# Patient Record
Sex: FEMALE | Race: ASIAN | NOT HISPANIC OR LATINO | URBAN - METROPOLITAN AREA
[De-identification: names, ages, dates, MRNs, and addresses within clinical notes are randomized per-mention and may not be internally consistent; named-entity substitution may affect disease eponyms.]

---

## 2019-12-12 ENCOUNTER — GYNECOLOGY VISIT (OUTPATIENT)
Dept: OBGYN | Facility: CLINIC | Age: 34
End: 2019-12-12
Payer: COMMERCIAL

## 2019-12-12 VITALS — WEIGHT: 121 LBS | DIASTOLIC BLOOD PRESSURE: 65 MMHG | SYSTOLIC BLOOD PRESSURE: 120 MMHG

## 2019-12-12 DIAGNOSIS — N93.9 ABNORMAL UTERINE BLEEDING (AUB): ICD-10-CM

## 2019-12-12 DIAGNOSIS — Z12.4 SCREENING FOR CERVICAL CANCER: ICD-10-CM

## 2019-12-12 DIAGNOSIS — Z01.419 WELL WOMAN EXAM: ICD-10-CM

## 2019-12-12 PROCEDURE — 99385 PREV VISIT NEW AGE 18-39: CPT | Performed by: OBSTETRICS & GYNECOLOGY

## 2019-12-12 RX ORDER — NORELGESTROMIN AND ETHINYL ESTRADIOL 35; 150 UG/MG; UG/MG
1 PATCH TRANSDERMAL
Qty: 4 PATCH | Refills: 13 | Status: SHIPPED | OUTPATIENT
Start: 2019-12-12

## 2019-12-12 SDOH — HEALTH STABILITY: MENTAL HEALTH: HOW OFTEN DO YOU HAVE A DRINK CONTAINING ALCOHOL?: NEVER

## 2019-12-12 NOTE — PROGRESS NOTES
New patient   CC: AUB, needs pap     Nona Guzman is a 34 y.o.  who presents today as a new patient.  Reports her menses have been longer since having her child about 2 years ago.  Menses used to last about 6 days and would occur every month.  Were never particularly heavy.  Now menses last for about 10 days.  These cycles are still not particularly heavy but she finds it annoying that they last so long.  Has never been on contraception or seen a doctor for her menses.  Is not interested in conceiving and would like to discuss options.  Denies any other concerns today.    GYN History:  LMP 18.  Menarche @12.  Has never had a pap.  No history of cone biopsy, LEEP or any other cervical, uterine or gynecologic surgery. No history of sexually transmitted diseases.  Currently sexually active with one male partner.  Utilizing condoms for contraception and has used condoms in the past.     OB History:    OB History   No obstetric history on file.   G1 -  10/7/2017 performed in [place] for unsure reason (didn't go into labor)    Review of Systems:   Pertinent positives documented in HPI and all other systems reviewed & are negative.    All PMH, PSH, allergies, social history and FH reviewed and updated today:  Past Medical History:  History reviewed. No pertinent past medical history.    Past Surgical History:  Past Surgical History:   Procedure Laterality Date   • PRIMARY C SECTION       Medications:   No current Epic-ordered outpatient medications on file.     No current Epic-ordered facility-administered medications on file.      Allergies: Patient has no known allergies.    Social History:  Social History     Socioeconomic History   • Marital status:      Spouse name: Not on file   • Number of children: Not on file   • Years of education: Not on file   • Highest education level: Not on file   Occupational History   • Not on file   Social Needs   • Financial resource strain: Not on file   • Food  insecurity:     Worry: Not on file     Inability: Not on file   • Transportation needs:     Medical: Not on file     Non-medical: Not on file   Tobacco Use   • Smoking status: Never Smoker   • Smokeless tobacco: Never Used   Substance and Sexual Activity   • Alcohol use: Never     Frequency: Never   • Drug use: Not on file   • Sexual activity: Yes     Partners: Male   Lifestyle   • Physical activity:     Days per week: Not on file     Minutes per session: Not on file   • Stress: Not on file   Relationships   • Social connections:     Talks on phone: Not on file     Gets together: Not on file     Attends Hindu service: Not on file     Active member of club or organization: Not on file     Attends meetings of clubs or organizations: Not on file     Relationship status: Not on file   • Intimate partner violence:     Fear of current or ex partner: Not on file     Emotionally abused: Not on file     Physically abused: Not on file     Forced sexual activity: Not on file   Other Topics Concern   • Not on file   Social History Narrative   • Not on file       Family History:  Family History   Problem Relation Age of Onset   • No Known Problems Mother    • No Known Problems Father            Objective:   Vitals:  /65   Wt 54.9 kg (121 lb)   There is no height or weight on file to calculate BMI. (Goal BM I>18 <25)    Physical Exam:   Nursing note and vitals reviewed.  GENERAL: No acute distress  HENT: Atraumatic, normocephalic  EYES: Extraocular movements intact, pupils equal and reactive to light  NECK: Supple, Full ROM  CHEST: No deformities, Equal chest expansion  RESP: Unlabored, no stridor or audible wheeze  ABD: Soft, Nontender, Non-Distended  Extremities: No Clubbing, Cyanosis, or Edema  Skin: Warm/dry, without rases  Neuro: A/O x 4, CN 2-12 Grossly intact, Motor/sensory grossly intact  Psych: Normal mood, behavior, and affect    Genitourinary:  Normal appearing external female genitalia without any rashes,  lesions, labial fusion or tenderness.  Vagina is pink moist and well rugated. Some blood pesent within vaginal vault.  Cervix well visualized without masses or lesions.  On bimanual exam there is no cervical motion tenderness, uterus is anteverted, not enlarged, fixed, or tender.  No adnexal masses or tenderness.      Assessment/Plan:   Nona Guzman is a 34 y.o. No obstetric history on file. female who presents for:    1. Abnormal uterine bleeding (AUB)  norelgestromin-ethinyl estradiol (ORTHO EVRA) 150-35 MCG/24HR patch   2. Screening for cervical cancer  THINPREP PAP W/HPV AND CTNG   #Well woman.  Discussed breast self awareness, keeping up to date on vaccination, safety precautions, diet and exercise.  # AUB - longer menses.  Today we discussed birth control methods. The methods we discussed pills, patch, vaginal ring, sub-dermal implant, depo injection, intrauterine devices  #Screen for cervical cancer.  Has never had a pap.  Performed today along with HPV.  Will manage as per asccp guidelines upon result.  #Follow up.  RTC in 3 months or sooner if concerns or questions arise.    Patient was seen for 45 minutes of which > 50% of appointment time was spent on face-to-face counseling and coordination of care regarding the above.

## 2021-11-23 ENCOUNTER — TELEPHONE (OUTPATIENT)
Dept: OBGYN | Facility: CLINIC | Age: 36
End: 2021-11-23

## 2021-12-09 ENCOUNTER — TELEPHONE (OUTPATIENT)
Dept: OBGYN | Facility: CLINIC | Age: 36
End: 2021-12-09

## 2021-12-10 ENCOUNTER — APPOINTMENT (OUTPATIENT)
Dept: OBGYN | Facility: CLINIC | Age: 36
End: 2021-12-10
Payer: COMMERCIAL

## 2021-12-10 NOTE — NON-PROVIDER
Patient here c/o  LMP=  BCM:  Last pap date/result:  Last mammogram if applicable  Phone number:  Pharmacy confirmed.

## 2023-08-09 ENCOUNTER — OFFICE VISIT (OUTPATIENT)
Dept: INTERNAL MEDICINE | Facility: OTHER | Age: 38
End: 2023-08-09
Payer: COMMERCIAL

## 2023-08-09 VITALS
WEIGHT: 117.2 LBS | DIASTOLIC BLOOD PRESSURE: 70 MMHG | HEART RATE: 74 BPM | OXYGEN SATURATION: 97 % | HEIGHT: 61 IN | TEMPERATURE: 98.8 F | BODY MASS INDEX: 22.13 KG/M2 | SYSTOLIC BLOOD PRESSURE: 107 MMHG

## 2023-08-09 DIAGNOSIS — Z86.39 HISTORY OF HYPERLIPIDEMIA: ICD-10-CM

## 2023-08-09 DIAGNOSIS — Z23 NEED FOR VACCINATION: ICD-10-CM

## 2023-08-09 DIAGNOSIS — N93.9 ABNORMAL UTERINE BLEEDING: ICD-10-CM

## 2023-08-09 PROCEDURE — 90471 IMMUNIZATION ADMIN: CPT

## 2023-08-09 PROCEDURE — 3078F DIAST BP <80 MM HG: CPT | Mod: GC

## 2023-08-09 PROCEDURE — 99203 OFFICE O/P NEW LOW 30 MIN: CPT | Mod: 25,GE

## 2023-08-09 PROCEDURE — 3074F SYST BP LT 130 MM HG: CPT | Mod: GC

## 2023-08-09 PROCEDURE — 90715 TDAP VACCINE 7 YRS/> IM: CPT

## 2023-08-09 ASSESSMENT — PATIENT HEALTH QUESTIONNAIRE - PHQ9: CLINICAL INTERPRETATION OF PHQ2 SCORE: 0

## 2023-08-09 NOTE — PROGRESS NOTES
Office Visit Initial Encounter    Chief Complaint   Patient presents with    Establish Care       HPI   Ms. Guzman is a 39 yo cantonese-speaking female (translation services #[id number]) with history of AUB on hormonal therapy (f/u by Gyn), otherwise healthy, who comes in for establishment of care. Patient does not have acute complains. She is not currently working, takes care of things at home, lives with her 6-yo son and . States she was told once she had high cholesterol. She goes biking with her son once a week and has a balanced diet.     Past Medical History  No past medical history on file.    Allergies:     Patient has no known allergies.    Medications    Current Outpatient Medications:     norelgestromin-ethinyl estradiol, 1 Patch, Transdermal, Q7 DAYS, Taking    Family History  Family History   Problem Relation Age of Onset    No Known Problems Mother     No Known Problems Father        Surgical History  Past Surgical History:   Procedure Laterality Date    PRIMARY C SECTION         Social History   Social History     Tobacco Use    Smoking status: Never    Smokeless tobacco: Never   Vaping Use    Vaping Use: Never used   Substance Use Topics    Alcohol use: Never    Drug use: Never       ROS     General: No fevers, chills, night sweats, weight loss or gain.  H&N: No hearing changes, vision changes, eye pain, ear pain, nasal discharge, sore throat, no neck swelling.  Pulmonary: No shortness of breath, cough, sputum, or hemoptysis.  Cardiovascular: No chest pain, palpitations, or LE swelling.   GI: No nausea, vomiting, diarrhea, constipation, abdominal pain, hematochezia or melena. Soft BM twice a day for the last week.   : No dysuria, frequency, retention or hematuria.   Neuro: No headaches, no lightheadedness, no dizziness. No focal weakness, no general weakness. No gait disturbances.   Psych: No anxiety or depression.     Physical Exam     /70 (BP Location: Left arm, Patient Position:  "Sitting, BP Cuff Size: Adult)   Pulse 74   Temp 37.1 °C (98.8 °F) (Temporal)   Ht 1.556 m (5' 1.25\")   Wt 53.2 kg (117 lb 3.2 oz)   SpO2 97%   BMI 21.96 kg/m²       General: Awake, alert and oriented.  Head and Neck: NC/AT, EOMI, no scleral icterus or conjunctival pallor, no nasal discharge or oral erythema or exudates. Neck supple, no LADs.   CV: Rhythmic heart sounds, no murmurs, gallops or rubs. No S3,S4 or JVD. Dorsalis pedis/posterior tibial pulses present, symmetrical.   Pulm: Chest expansion is symmetrical, no crackles, rales, rhonchi, or wheezing.  GI: Flat, non distended, soft, non tender, normal bowel sounds.  Skin: Warm, no rashes, no lesions.  MSK: Normal ROM. No lower extremity edema. No lesions.   Neuro: Patient is alert and oriented x3. Speech is clear and fluent, goal directed. Is able to name, repeat and comprehend. Pupils equal, round and reactive to light. Good eye contact. Extra ocular movements intact. Facial and body symmetry. Strength 5 out of 5 in upper and lower extremities. Sensitivity intact. Normal deep tendon reflexes. Normal tone.   Psych: Appropriate mood and affect.     Diagnostic tests  None.    Note: I have reviewed all pertinent labs and diagnostic tests associated with this visit with specific comments listed under the assessment and plan below    Assessment and Plan    AUB  Improved with hormonal therapy.  - Continue current regimen and f/u with Gyn   - Check CBC and CMP    HCM   TDaP vaccine provided  Last pap smear reported as wnl last year  Discussed healthy lifestyle     History of hyperlipidemia   - Check lipid panel     Return in about 1 year (around 8/9/2024).    Kathia SILVA PGY-2  Internal Medicine [facility]    Pt has been seen and discussed with the Attending Physician   "

## 2023-08-09 NOTE — PATIENT INSTRUCTIONS
Thank you for coming today!   Please remember a balanced lifestyle helps to keep your heart healthy. Some tips for a healthy heart include:  Exercise for 30 minutes, at least 5 times a week or 1 hour 3 times a week.   Avoid fatty, processed, sweetened food.   Please get the labs done before next visit, I will see you in 1 year if anything is abnormal  Continue following with gynecologist

## 2023-08-19 DIAGNOSIS — E78.2 MODERATE MIXED HYPERLIPIDEMIA NOT REQUIRING STATIN THERAPY: ICD-10-CM

## 2023-11-15 ENCOUNTER — APPOINTMENT (OUTPATIENT)
Dept: INTERNAL MEDICINE | Facility: OTHER | Age: 38
End: 2023-11-15
Payer: COMMERCIAL

## 2024-02-28 ENCOUNTER — GYNECOLOGY VISIT (OUTPATIENT)
Dept: OBGYN | Facility: CLINIC | Age: 39
End: 2024-02-28
Payer: COMMERCIAL

## 2024-02-28 VITALS — BODY MASS INDEX: 22.11 KG/M2 | SYSTOLIC BLOOD PRESSURE: 115 MMHG | DIASTOLIC BLOOD PRESSURE: 63 MMHG | WEIGHT: 118 LBS

## 2024-02-28 DIAGNOSIS — Z01.419 WOMEN'S ANNUAL ROUTINE GYNECOLOGICAL EXAMINATION: Primary | ICD-10-CM

## 2024-02-28 PROCEDURE — 99385 PREV VISIT NEW AGE 18-39: CPT | Performed by: OBSTETRICS & GYNECOLOGY

## 2024-02-28 PROCEDURE — 3074F SYST BP LT 130 MM HG: CPT | Performed by: OBSTETRICS & GYNECOLOGY

## 2024-02-28 PROCEDURE — 3078F DIAST BP <80 MM HG: CPT | Performed by: OBSTETRICS & GYNECOLOGY

## 2024-02-28 ASSESSMENT — FIBROSIS 4 INDEX: FIB4 SCORE: 0.49

## 2024-02-28 NOTE — PROGRESS NOTES
"ANNUAL GYNECOLOGY VISIT    Chief Complaint  Gynecologic Exam      Subjective  Shyla Guzman is a 38 y.o. female who presents today for Annual Exam as NIVIA from Dr. Barroso.  Last pap in 2022 and 2021 both wnl with neg HPV.  She complains of a vague \"pulling\" sensation in her vagina without any specific alleviating or exacerbating factors.  She also feels like her urinary stream is weak but denies any surgeries for incontinence. She's had 1  and 1 hysteroscopy/D&C for SAB/uterine adhesions.  She is sexually active and uses condoms    Preventive Care   Immunization History   Administered Date(s) Administered    MODERNA SARS-COV-2 VACCINE (12+) 04/10/2021, 2021, 2021    Tdap Vaccine 2023     Last Mammogram: n/a    Gynecology History and ROS  Current Sexual Activity: Yes  History of sexually transmitted diseases?  no  Abnormal discharge? No  Current Contraception:  condoms    Menstrual History  Patient's last menstrual period was 2024 (exact date).  Periods are regular  q month except she reports skipping menses in December but had normal menses in  and Feb  She reports rare hot flashes     Clots or heavy flow: No  Dysmenorrhea: No  Intermenstrual bleeding/spotting: No  Significant pain with periods:No  Bothersome PMS symptoms: No  Significant Pelvic Pain: No      Pap History  Last pap smear: 2022 - pap/cotest wnl  History of moderate or severe dysplasia: No    Cancer Risk Assessement:  Family history of:   - Breast cancer: no   - Ovarian cancer: no   - Uterine cancer: no   - Colon cancer: no    Obstetric History  OB History    Para Term  AB Living   2 1 1   1 1   SAB IAB Ectopic Molar Multiple Live Births   1         1      # Outcome Date GA Lbr [place]/2nd Weight Sex Delivery Anes PTL Lv   2 Term 2017 [id number]   M CS-LTranv  N DEBORAH   1 SAB                Past Medical History  History reviewed. No pertinent past medical history.    Past Surgical History  Past Surgical " History:   Procedure Laterality Date    PRIMARY C SECTION         Social History  Social History     Socioeconomic History    Marital status:      Spouse name: Not on file    Number of children: Not on file    Years of education: Not on file    Highest education level: Not on file   Occupational History    Not on file   Tobacco Use    Smoking status: Never    Smokeless tobacco: Never   Vaping Use    Vaping Use: Never used   Substance and Sexual Activity    Alcohol use: Never    Drug use: Never    Sexual activity: Yes     Partners: Male     Birth control/protection: Pill   Other Topics Concern    Not on file   Social History Narrative    Not on file     Social Determinants of Health     Financial Resource Strain: Not on file   Food Insecurity: Not on file   Transportation Needs: Not on file   Physical Activity: Not on file   Stress: Not on file   Social Connections: Not on file   Intimate Partner Violence: Not on file   Housing Stability: Not on file       Family History  Family History   Problem Relation Age of Onset    No Known Problems Mother     No Known Problems Father        Home Medications  Current Outpatient Medications on File Prior to Visit   Medication Sig Dispense Refill    norelgestromin-ethinyl estradiol (ORTHO EVRA) 150-35 MCG/24HR patch Apply 1 Patch to skin as directed every 7 days. 4 Patch 13     No current facility-administered medications on file prior to visit.       Allergies/Reactions  No Known Allergies    ROS  Positive ROS: none  Gen: no fevers or chills, no significant weight loss or gain, excessive fatigue  Respiratory:  no cough or dyspnea  Cardiac:  no chest pain, no palpitations, no syncope  Breast: no breast discharge, pain, lump or skin changes  GI:  no heartburn, no abdominal pain, no nausea or vomiting  Urinary: no dysuria, urgency, frequency, incontinence   Psych: no depression or anxiety  Neuro: no migraines with aura, fainting spells, numbness or tingling  Extremities: no  "joint pain, persistently swollen ankles, recurrent leg cramps      Physical Examination:  Vital Signs:   Vitals:    02/28/24 1332   BP: 115/63   BP Location: Right arm   Patient Position: Sitting   BP Cuff Size: Adult   Weight: 118 lb     Body mass index is 22.11 kg/m².    Constitutional: The patient is well developed and well nourished.  Psychiatric: Patient is oriented to time place and person.   Skin: No rash observed.  Neck: Appears symmetric. Thyroid normal size  Respiratory: normal effort  Breast: Inspection reveals no asymetry or nipple discharge, no skin thickening, dimpling or erythema.  Palpation demonstrates no masses.  Abdomen: Soft, non-tender.  Pelvic Exam:     Vulva: external female genitalia are normal in appearance. No lesions    Urethra - no lesions, no erythema    Vagina: moist, pink, normal ruggae    Cervix: pink, smooth, no lesions, no CMT    Uterus - non-tender, normal size, shape, contour, mobile, anteverted    Ovaries: non-tender, no appreciable masses  Pap Smear performed: No  Extremeties: Legs are symmetric and without tenderness. There is no edema present.    Labs/Imaging:  HDL (mg/dL)   Date Value   02/16/2024 70     No components found for: \"A1C1\"  WBC (x10E3/uL)   Date Value   02/16/2024 6.8     Lab Results   Component Value Date/Time    CO2 23 02/16/2024 0757    BUN 11 02/16/2024 0757           Assessment & Plan  Shyla Guzman is a 38 y.o. female who presents today for Annual Gyn Exam.     1. Women's annual routine gynecological examination  Breast and pelvic exam completed  Pap: not indicated until 2025  Mammogram: order at annual next year when 39  Advised on breast self awareness  Advised on flu and/or covid vaccines in season  Advised on calcium and vit D intake     - Discussed options for pelvic floor PT for pulling sensation although nothing found on exam.  Discussed option for referral to urogyn for urinary flow if desired.   She elects to observe both of these issues for " now          Return: Annually or PRN    Briana Haas D.O.

## 2024-03-08 ENCOUNTER — OFFICE VISIT (OUTPATIENT)
Dept: INTERNAL MEDICINE | Facility: OTHER | Age: 39
End: 2024-03-08
Payer: COMMERCIAL

## 2024-03-08 VITALS
BODY MASS INDEX: 22.69 KG/M2 | OXYGEN SATURATION: 96 % | HEART RATE: 74 BPM | HEIGHT: 61 IN | DIASTOLIC BLOOD PRESSURE: 60 MMHG | WEIGHT: 120.2 LBS | SYSTOLIC BLOOD PRESSURE: 106 MMHG | TEMPERATURE: 98.2 F

## 2024-03-08 DIAGNOSIS — Z11.59 ENCOUNTER FOR HCV SCREENING TEST FOR LOW RISK PATIENT: ICD-10-CM

## 2024-03-08 DIAGNOSIS — E78.2 MIXED HYPERLIPIDEMIA: ICD-10-CM

## 2024-03-08 PROCEDURE — 3074F SYST BP LT 130 MM HG: CPT

## 2024-03-08 PROCEDURE — 99213 OFFICE O/P EST LOW 20 MIN: CPT | Mod: GE

## 2024-03-08 PROCEDURE — 3078F DIAST BP <80 MM HG: CPT

## 2024-03-08 ASSESSMENT — FIBROSIS 4 INDEX: FIB4 SCORE: 0.49

## 2024-03-08 ASSESSMENT — PATIENT HEALTH QUESTIONNAIRE - PHQ9: CLINICAL INTERPRETATION OF PHQ2 SCORE: 0

## 2024-03-08 NOTE — PROGRESS NOTES
"      Office Visit Follow up    Chief Complaint   Patient presents with    Lab Results    Hyperlipidemia     Patient here for lab follow up       Subjective   Ms. Guzman is a 39 yo cantonese-speaking female (translation services #[id number]) with history of AUB on hormonal therapy (f/u by Gyn), and hypertriglyceridemia (on omega 3), who presents for follow up on this latter chronic problem. She has a fish and vegetable-based diet, denies significant consumption of sweetened or processed food, no fried/fast food. No weight gain. No family hx of cardiovascular disease but mom and dad suffer from high cholesterol ages 50-60s.     ROS     General: No fevers, chills, night sweats, weight loss or gain.  H&N: No hearing changes, vision changes, eye pain, ear pain, nasal discharge, sore throat, no neck swelling.  Pulmonary: No shortness of breath, cough, sputum, or hemoptysis.  Cardiovascular: No chest pain, palpitations, or LE swelling.   GI: No nausea, vomiting, diarrhea, constipation, abdominal pain, hematochezia or melena.  : No dysuria, frequency, incontinence, retention or hematuria.   MSK: Some occasional mild lower back pain, does not radiate anywhere, no worsening. No sensory changes.   Neuro: No headaches, no lightheadedness, no dizziness. No focal weakness, no general weakness. No gait disturbances.   Psych: No anxiety or depression.     Physical Exam     /60 (BP Location: Left arm, Patient Position: Sitting, BP Cuff Size: Adult)   Pulse 74   Temp 36.8 °C (98.2 °F) (Temporal)   Ht 1.556 m (5' 1.25\")   Wt 54.5 kg (120 lb 3.2 oz)   LMP 02/18/2024 (Exact Date)   SpO2 96%   BMI 22.53 kg/m²     General: No acute distress, good interaction.   Head and Neck: NC/AT, EOMI, no scleral icterus or conjunctival pallor, no nasal discharge or oral erythema or exudates. Neck supple, no LADs.   CV: Rhythmic heart sounds, no murmurs, gallops or rubs. No S3,S4 or JVD. Dorsalis pedis/posterior tibial pulses present, " symmetrical.   Pulm: Chest expansion is symmetrical, no crackles, rales, rhonchi, or wheezing.  GI: Flat, non distended, soft, non tender, normal bowel sounds.  MSK: Normal ROM. No lower extremity edema. No lesions. No point tenderness in lower back.   Neuro: Patient is alert and oriented x3. Speech is clear and fluent, goal directed. Pupils equal, round and reactive to light. Good eye contact. Extra ocular movements intact. Facial and body symmetry. Strength 5 out of 5 in upper and lower extremities. Sensitivity intact. Normal deep tendon reflexes. Normal tone.   Psych: Appropriate mood and affect.     Diagnostic tests  2/16/24: BMP normal, LP: 260/90/70/175. CBC normal.     Note: I have reviewed all pertinent labs and diagnostic tests associated with this visit with specific comments listed under the assessment and plan below    Assessment and Plan    Mixed hyperlipidemia   (Hypertriglyceridemia and hyperlipidemia)   LDL significantly increased after she started OTC fish oil omega - 3. She is taking 2g a day. Likely this raise in LDL is not associated with metabolic syndrome.  - Counseled on stopping therapy or reducing to 500mg a day   - Recheck lipid panel before next visit   - Counseled on regular exercise routine and continue balanced diet   - Check TSH with next labs     Return in about 4 months (around 7/8/2024).      Kathia SILVA PGY-2  Internal Medicine [facility]    Pt has been seen and discussed with the Attending Physician

## 2024-03-08 NOTE — PATIENT INSTRUCTIONS
Get the blood work a week before next visit  Stop the 1000mg fish oil, just take 500mg once a day

## 2024-07-03 ENCOUNTER — APPOINTMENT (OUTPATIENT)
Dept: INTERNAL MEDICINE | Facility: OTHER | Age: 39
End: 2024-07-03
Payer: COMMERCIAL

## 2024-07-10 ENCOUNTER — APPOINTMENT (OUTPATIENT)
Dept: INTERNAL MEDICINE | Facility: OTHER | Age: 39
End: 2024-07-10
Payer: COMMERCIAL

## 2024-07-10 VITALS
SYSTOLIC BLOOD PRESSURE: 96 MMHG | DIASTOLIC BLOOD PRESSURE: 56 MMHG | OXYGEN SATURATION: 96 % | WEIGHT: 116 LBS | HEIGHT: 61 IN | TEMPERATURE: 97.2 F | HEART RATE: 89 BPM | BODY MASS INDEX: 21.9 KG/M2

## 2024-07-10 DIAGNOSIS — E78.2 MIXED HYPERLIPIDEMIA: ICD-10-CM

## 2024-07-10 DIAGNOSIS — E05.90 HYPERTHYROIDISM: ICD-10-CM

## 2024-07-10 PROCEDURE — 99213 OFFICE O/P EST LOW 20 MIN: CPT | Mod: GE

## 2024-07-10 PROCEDURE — 3078F DIAST BP <80 MM HG: CPT

## 2024-07-10 PROCEDURE — 3074F SYST BP LT 130 MM HG: CPT

## 2024-07-10 ASSESSMENT — FIBROSIS 4 INDEX: FIB4 SCORE: 0.49

## 2024-08-21 ENCOUNTER — APPOINTMENT (OUTPATIENT)
Dept: INTERNAL MEDICINE | Facility: OTHER | Age: 39
End: 2024-08-21
Payer: COMMERCIAL

## 2024-08-27 DIAGNOSIS — E05.90 HYPERTHYROIDISM: ICD-10-CM

## 2024-08-28 ENCOUNTER — TELEPHONE (OUTPATIENT)
Dept: INTERNAL MEDICINE | Facility: OTHER | Age: 39
End: 2024-08-28
Payer: COMMERCIAL

## 2024-08-28 NOTE — TELEPHONE ENCOUNTER
----- Message from Resident Kathia Hassan M.D. sent at 8/27/2024  1:32 PM PDT -----  Ok thanks [place].   It's fine, she can keep the upcoming appointment, in the meantime I have placed an endocrinology referral, can you please let her know of it and provide guidance on scheduling the thyroid ultrasound? I don't think she has scheduled that.     Thanks!   OBNY NUÑEZ  ----- Message -----  From: [facility]  Sent: 8/26/2024   8:34 AM PDT  To: Kathia Hassan M.D.    Hi Dr. Hassan,   There is no phone translation for telephone calls. Do you need patient to come in sooner?  ----- Message -----  From: Kathia Hassan M.D.  Sent: 8/24/2024  10:27 AM PDT  To: [facility]    Saad Be,     I would like to call this patient about her results, I was wondering if there is any translation services I can use for just a telephone encounter? If not that is fine, I'll see her next month. Pls let me know.     Thanks!  BONY NUÑEZ

## 2024-09-23 ENCOUNTER — HOSPITAL ENCOUNTER (OUTPATIENT)
Dept: RADIOLOGY | Facility: MEDICAL CENTER | Age: 39
End: 2024-09-23
Payer: COMMERCIAL

## 2024-09-23 DIAGNOSIS — E05.90 HYPERTHYROIDISM: ICD-10-CM

## 2024-09-23 PROCEDURE — 76536 US EXAM OF HEAD AND NECK: CPT

## 2024-09-27 ENCOUNTER — APPOINTMENT (OUTPATIENT)
Dept: INTERNAL MEDICINE | Facility: OTHER | Age: 39
End: 2024-09-27
Payer: COMMERCIAL

## 2024-09-27 VITALS
SYSTOLIC BLOOD PRESSURE: 113 MMHG | TEMPERATURE: 98.2 F | HEART RATE: 82 BPM | WEIGHT: 110.4 LBS | HEIGHT: 61 IN | OXYGEN SATURATION: 95 % | DIASTOLIC BLOOD PRESSURE: 59 MMHG | BODY MASS INDEX: 20.84 KG/M2

## 2024-09-27 DIAGNOSIS — E78.2 MIXED HYPERLIPIDEMIA: ICD-10-CM

## 2024-09-27 DIAGNOSIS — E05.00 GRAVES' DISEASE: ICD-10-CM

## 2024-09-27 LAB
POCT INT CON NEG: NEGATIVE
POCT INT CON POS: POSITIVE
POCT URINE PREGNANCY TEST: NEGATIVE

## 2024-09-27 RX ORDER — PROPRANOLOL HCL 60 MG
60 CAPSULE, EXTENDED RELEASE 24HR ORAL DAILY
Qty: 100 CAPSULE | Refills: 0 | Status: SHIPPED | OUTPATIENT
Start: 2024-09-27

## 2024-09-27 ASSESSMENT — FIBROSIS 4 INDEX: FIB4 SCORE: .5405405405405405405

## 2024-09-27 NOTE — PROGRESS NOTES
"      Office Visit Follow up    Chief Complaint   Patient presents with    Follow-Up     bloodwork       Subjective   Ms. Guzman is a 40 yo cantonese-speaking female (translation services #[id number]) with history of AUB on hormonal therapy (f/u by Gyn), dyslipidemia and hypertriglyceridemia (on omega 3), who presents for follow up on recent diagnosis of hyperthyroidism.  We first checked a TSH due to her very abnormal lipid panel, occasional diarrhea and family history of hyperthyroidism.  Her TSH came back low, then repeated and persistently low with 2 times higher T4 in 2 3.  TSI are elevated, consistent with Graves' disease.  Today, she reports brief episodes of palpitations lasting less than a minute happening once or twice a day, she is being feeling more warm throughout the day, notices skin and hair changes, has 2-3 bowel movements a day of normal consistency.  Denies chest pain, shortness of breath, abdominal pain, leg swelling.  Her last period was 2 to 3 months ago, she stopped using her hormone therapy, states no concern for pregnancy.    ROS     General: No fevers, chills, night sweats, weight loss or gain but difficulty gaining weight.   Pulmonary: No shortness of breath, cough, sputum, or hemoptysis.  : No dysuria, frequency, incontinence, retention or hematuria.   Neuro: No headaches, no lightheadedness, no dizziness. No focal weakness, no general weakness. No gait disturbances.   Psych: No anxiety or depression or mood changes in general.    Physical Exam     /59 (BP Location: Left arm, Patient Position: Sitting, BP Cuff Size: Small adult)   Pulse 82   Temp 36.8 °C (98.2 °F) (Temporal)   Ht 1.55 m (5' 1.02\")   Wt 50.1 kg (110 lb 6.4 oz)   SpO2 95%   BMI 20.84 kg/m²     General: No acute distress, good interaction.   Head and Neck: NC/AT, EOMI, no scleral icterus or conjunctival pallor, no nasal discharge or oral erythema or exudates. Neck supple, no LADs, + symmetric thyromegaly.   CV: " Rhythmic heart sounds, no murmurs, gallops or rubs. No S3,S4 or JVD. Dorsalis pedis/posterior tibial pulses present, symmetrical.   Pulm: Chest expansion is symmetrical, no crackles, rales, rhonchi, or wheezing.  GI: Flat, non distended, soft, non tender, normal bowel sounds.  MSK: Normal ROM. No lower extremity edema. No lesions. No point tenderness in lower back.   Neuro: Patient is alert and oriented x3. Speech is clear and fluent, goal directed. Pupils equal, round and reactive to light. Good eye contact. Extra ocular movements intact. Facial and body symmetry. Strength 5 out of 5 in upper and lower extremities. Sensitivity intact. Normal deep tendon reflexes. Normal tone.   Psych: Appropriate mood and affect.     Diagnostic tests  Thyroid US 9/23: Heterogeneous thyroid parenchyma, otherwise unremarkable thyroid ultrasound.    Note: I have reviewed all pertinent labs and diagnostic tests associated with this visit with specific comments listed under the assessment and plan below    Assessment and Plan    Hyperthyroidism  Persistently low TSH, T4 and T3 2 times upper level normal.  Positive TSI.  No nodules or thyromegaly seen in ultrasound, just heterogeneous thyroid tissue.  Consistent with Graves' disease.  Very mild symptoms as mentioned above, no concern for significant cardiovascular disease/impact.  No Graves' ophthalmopathy. Given her mild disease, she has an overall good prognosis in terms of potential regression after 2 years of medical therapy if that is her decision, she is to have further discussions with endocrinology about definitive therapies.  -Start propranolol 60 mg daily (extended release)  -Gave her information to schedule with endocrinology (referral had been placed)  -Pregnancy test obtained in office, negative    Dyslipidemia   Hypertriglyceridemia  Likely associated with thyroid disease.  No new interventions for now.  - Keep current dose of Omega 3    Return in about 5 weeks (around  11/1/2024).    Kathia SILVA PGY-3  Internal Medicine [facility]    Pt has been discussed with the Attending Physician

## 2024-10-30 ENCOUNTER — OFFICE VISIT (OUTPATIENT)
Dept: ENDOCRINOLOGY | Facility: MEDICAL CENTER | Age: 39
End: 2024-10-30
Payer: COMMERCIAL

## 2024-10-30 VITALS
HEIGHT: 61 IN | SYSTOLIC BLOOD PRESSURE: 100 MMHG | BODY MASS INDEX: 21.52 KG/M2 | OXYGEN SATURATION: 95 % | DIASTOLIC BLOOD PRESSURE: 62 MMHG | HEART RATE: 92 BPM | WEIGHT: 114 LBS

## 2024-10-30 DIAGNOSIS — R00.2 PALPITATIONS: ICD-10-CM

## 2024-10-30 DIAGNOSIS — E05.90 HYPERTHYROIDISM: ICD-10-CM

## 2024-10-30 PROCEDURE — 99211 OFF/OP EST MAY X REQ PHY/QHP: CPT

## 2024-10-30 ASSESSMENT — FIBROSIS 4 INDEX: FIB4 SCORE: .5405405405405405405

## 2024-11-03 LAB
ALBUMIN SERPL-MCNC: 4.2 G/DL (ref 3.9–4.9)
ALP SERPL-CCNC: 106 IU/L (ref 44–121)
ALT SERPL-CCNC: 28 IU/L (ref 0–32)
AST SERPL-CCNC: 19 IU/L (ref 0–40)
BILIRUB SERPL-MCNC: 0.5 MG/DL (ref 0–1.2)
BUN SERPL-MCNC: 16 MG/DL (ref 6–24)
BUN/CREAT SERPL: 38 (ref 9–23)
CALCIUM SERPL-MCNC: 9.8 MG/DL (ref 8.7–10.2)
CHLORIDE SERPL-SCNC: 103 MMOL/L (ref 96–106)
CO2 SERPL-SCNC: 22 MMOL/L (ref 20–29)
CREAT SERPL-MCNC: 0.42 MG/DL (ref 0.57–1)
EGFRCR SERPLBLD CKD-EPI 2021: 127 ML/MIN/1.73
GLOBULIN SER CALC-MCNC: 3.3 G/DL (ref 1.5–4.5)
GLUCOSE SERPL-MCNC: 96 MG/DL (ref 70–99)
POTASSIUM SERPL-SCNC: 4.6 MMOL/L (ref 3.5–5.2)
PROT SERPL-MCNC: 7.5 G/DL (ref 6–8.5)
SODIUM SERPL-SCNC: 140 MMOL/L (ref 134–144)
T3FREE SERPL-MCNC: 11.4 PG/ML (ref 2–4.4)
T4 FREE SERPL-MCNC: 4.53 NG/DL (ref 0.82–1.77)
TSH RECEP AB SER-ACNC: 2.36 IU/L (ref 0–1.75)
TSH SERPL DL<=0.005 MIU/L-ACNC: <0.005 UIU/ML (ref 0.45–4.5)
TSI SER-ACNC: 1.54 IU/L (ref 0–0.55)

## 2024-11-04 DIAGNOSIS — E05.00 GRAVES DISEASE: ICD-10-CM

## 2024-11-04 DIAGNOSIS — E05.90 HYPERTHYROIDISM: ICD-10-CM

## 2024-11-04 RX ORDER — METHIMAZOLE 10 MG/1
10 TABLET ORAL 3 TIMES DAILY
Qty: 270 TABLET | Refills: 3 | Status: SHIPPED | OUTPATIENT
Start: 2024-11-04

## 2024-11-06 ENCOUNTER — APPOINTMENT (OUTPATIENT)
Dept: INTERNAL MEDICINE | Facility: OTHER | Age: 39
End: 2024-11-06
Payer: COMMERCIAL

## 2024-12-19 LAB
T3FREE SERPL-MCNC: 2.6 PG/ML (ref 2–4.4)
T4 FREE SERPL-MCNC: 1.33 NG/DL (ref 0.82–1.77)
TSH SERPL DL<=0.005 MIU/L-ACNC: 0.01 UIU/ML (ref 0.45–4.5)

## 2024-12-20 DIAGNOSIS — E05.90 HYPERTHYROIDISM: ICD-10-CM

## 2025-02-04 ENCOUNTER — APPOINTMENT (OUTPATIENT)
Dept: OBGYN | Facility: CLINIC | Age: 40
End: 2025-02-04
Payer: COMMERCIAL

## 2025-02-24 ENCOUNTER — TELEPHONE (OUTPATIENT)
Dept: ENDOCRINOLOGY | Facility: MEDICAL CENTER | Age: 40
End: 2025-02-24
Payer: COMMERCIAL

## 2025-02-24 DIAGNOSIS — E05.90 HYPERTHYROIDISM: ICD-10-CM

## 2025-02-25 ENCOUNTER — HOSPITAL ENCOUNTER (OUTPATIENT)
Dept: LAB | Facility: MEDICAL CENTER | Age: 40
End: 2025-02-25
Payer: COMMERCIAL

## 2025-02-25 DIAGNOSIS — E05.90 HYPERTHYROIDISM: ICD-10-CM

## 2025-02-25 LAB
ALBUMIN SERPL BCP-MCNC: 4.4 G/DL (ref 3.2–4.9)
ALBUMIN/GLOB SERPL: 1.3 G/DL
ALP SERPL-CCNC: 104 U/L (ref 30–99)
ALT SERPL-CCNC: 39 U/L (ref 2–50)
ANION GAP SERPL CALC-SCNC: 12 MMOL/L (ref 7–16)
AST SERPL-CCNC: 31 U/L (ref 12–45)
BILIRUB SERPL-MCNC: 0.7 MG/DL (ref 0.1–1.5)
BUN SERPL-MCNC: 14 MG/DL (ref 8–22)
CALCIUM ALBUM COR SERPL-MCNC: 8.6 MG/DL (ref 8.5–10.5)
CALCIUM SERPL-MCNC: 8.9 MG/DL (ref 8.5–10.5)
CHLORIDE SERPL-SCNC: 103 MMOL/L (ref 96–112)
CO2 SERPL-SCNC: 26 MMOL/L (ref 20–33)
CREAT SERPL-MCNC: 0.66 MG/DL (ref 0.5–1.4)
GFR SERPLBLD CREATININE-BSD FMLA CKD-EPI: 113 ML/MIN/1.73 M 2
GLOBULIN SER CALC-MCNC: 3.4 G/DL (ref 1.9–3.5)
GLUCOSE SERPL-MCNC: 95 MG/DL (ref 65–99)
POTASSIUM SERPL-SCNC: 4 MMOL/L (ref 3.6–5.5)
PROT SERPL-MCNC: 7.8 G/DL (ref 6–8.2)
SODIUM SERPL-SCNC: 141 MMOL/L (ref 135–145)
T3 SERPL-MCNC: 90.5 NG/DL (ref 60–181)
T3FREE SERPL-MCNC: 2.62 PG/ML (ref 2–4.4)
T4 FREE SERPL-MCNC: 1.02 NG/DL (ref 0.93–1.7)
TSH SERPL-ACNC: 2.99 UIU/ML (ref 0.35–5.5)

## 2025-02-25 PROCEDURE — 80053 COMPREHEN METABOLIC PANEL: CPT

## 2025-02-25 PROCEDURE — 84443 ASSAY THYROID STIM HORMONE: CPT

## 2025-02-25 PROCEDURE — 84439 ASSAY OF FREE THYROXINE: CPT

## 2025-02-25 PROCEDURE — 36415 COLL VENOUS BLD VENIPUNCTURE: CPT

## 2025-02-25 PROCEDURE — 84480 ASSAY TRIIODOTHYRONINE (T3): CPT

## 2025-02-25 PROCEDURE — 84481 FREE ASSAY (FT-3): CPT

## 2025-02-26 ENCOUNTER — OFFICE VISIT (OUTPATIENT)
Dept: ENDOCRINOLOGY | Facility: MEDICAL CENTER | Age: 40
End: 2025-02-26
Payer: COMMERCIAL

## 2025-02-26 VITALS
BODY MASS INDEX: 22.26 KG/M2 | HEART RATE: 71 BPM | WEIGHT: 121 LBS | SYSTOLIC BLOOD PRESSURE: 112 MMHG | HEIGHT: 62 IN | DIASTOLIC BLOOD PRESSURE: 58 MMHG | OXYGEN SATURATION: 94 %

## 2025-02-26 DIAGNOSIS — N92.6 IRREGULAR PERIODS/MENSTRUAL CYCLES: ICD-10-CM

## 2025-02-26 DIAGNOSIS — E78.5 DYSLIPIDEMIA: ICD-10-CM

## 2025-02-26 DIAGNOSIS — E05.00 GRAVES DISEASE: ICD-10-CM

## 2025-02-26 DIAGNOSIS — R00.2 PALPITATIONS: ICD-10-CM

## 2025-02-26 DIAGNOSIS — E05.90 HYPERTHYROIDISM: ICD-10-CM

## 2025-02-26 DIAGNOSIS — Z13.21 ENCOUNTER FOR VITAMIN DEFICIENCY SCREENING: ICD-10-CM

## 2025-02-26 PROCEDURE — 99211 OFF/OP EST MAY X REQ PHY/QHP: CPT

## 2025-02-26 ASSESSMENT — FIBROSIS 4 INDEX: FIB4 SCORE: 0.67

## 2025-02-26 NOTE — PROGRESS NOTES
Chief Complaint: Consult requested by Kathia Hassan M.D. for evaluation of Hyperthyroidism    HPI:     Shyla Guzman is a 39 y.o. female with history of hyperthyroidism and Grave's disease diagnosed on September 2024 and is here for initial evaluation.      Hyperthyroidism  She reports the following symptoms:fatigue-resolved, losing hair-some improvement , feeling excessive energy, tremulousness- resolved, palpitations-resolved , sweating-resolved, and weight loss-resolved, irregular periods- same     She denies weight gain, feeling cold and cold intolerance, constipation, swelling, feeling slow, and anxiousness.      She denies lumps or enlargement in the neck.    She reports a family history of hyperthyroidism with her father.       On follow up he was started on methimazole 10 mg TID in Oct 2024. Repeat blood work in dec 2024 showed improvement therefore we switched him to methimazole 10 mg BID.   Today he reports she has not had her period since Nov.    Latest Reference Range & Units 02/25/25 09:19   TSH 0.350 - 5.500 uIU/mL 2.990   Free T-4 0.93 - 1.70 ng/dL 1.02   T3 60.0 - 181.0 ng/dL 90.5   T3,Free 2.00 - 4.40 pg/mL 2.62      Latest Reference Range & Units 07/03/24 07:45 08/14/24 06:48   Free T-4 0.82 - 1.77 ng/dL  2.14 (H)   eGFR >59 mL/min/1.73  122   Thyroid Stim Immunoglobulin 0.00 - 0.55 IU/L  0.82 (H)   TSH 0.450 - 4.500 uIU/mL 0.007 (L) <0.005 (L)     2. Heart Palpitations  Currently propranolol 60 mg daily - stopped    3. Dyslipidemia  Currently not on statin therapy  Diet and exercise   Latest Reference Range & Units 07/03/24 07:45   Cholesterol,Tot 100 - 199 mg/dL 212 (H)   Triglycerides 0 - 149 mg/dL 105   HDL >39 mg/dL 61   LDL Chol Calc ([facility]) 0 - 99 mg/dL 132 (H)   VLDL Cholesterol Calc 5 - 40 mg/dL 19   LDL Calc Comment:  CANCELED     4. Graves disease   Latest Reference Range & Units 10/31/24 06:00   Thyroid Stim Immunoglobulin 0.00 - 0.55 IU/L 1.54 (H)   Thyrotropin Receptor Abs 0.00 -  1.75 IU/L 2.36 (H)     Patient's medications, allergies, and social histories were reviewed and updated as appropriate.      ROS:     CONS:     No fever, no chills, no weight loss, no fatigue   EYES:      No diplopia, no blurry vision, no redness of eyes, no swelling of eyelids   ENT:    No hearing loss, No ear pain, No sore throat, no dysphagia, no neck swelling   CV:     No chest pain, no palpitations, no claudication, no orthopnea, no PND   PULM:    No SOB, no cough, no hemoptysis, no wheezing    GI:   No nausea, no vomiting, no diarrhea, no constipation, no bloody stools   :  Passing urine well, no dysuria, no hematuria   ENDO:   No polyuria, no polydipsia, no heat intolerance, no cold intolerance   NEURO: No headaches, no dizziness, no convulsions, no tremors   MUSC:  No joint swellings, no arthralgias, no myalgias, no weakness   SKIN:   No rash, no ulcers, no dry skin   PSYCH:   No depression, no anxiety, no difficulty sleeping       Past Medical History:  Patient Active Problem List    Diagnosis Date Noted    Graves disease 02/26/2025    Irregular periods/menstrual cycles 02/26/2025    Hyperthyroidism 10/30/2024    Palpitations 10/30/2024       Past Surgical History:  Past Surgical History:   Procedure Laterality Date    PRIMARY C SECTION          Allergies:  Patient has no known allergies.     Current Medications:    Current Outpatient Medications:     methimazole (TAPAZOLE) 10 MG Tab, Take 1 Tablet by mouth 3 times a day., Disp: 270 Tablet, Rfl: 3    propranolol LA (INDERAL LA) 60 MG CAPSULE SR 24 HR, Take 1 Capsule by mouth every day., Disp: 100 Capsule, Rfl: 0    Social History:  Social History     Socioeconomic History    Marital status:      Spouse name: Not on file    Number of children: Not on file    Years of education: Not on file    Highest education level: Not on file   Occupational History    Not on file   Tobacco Use    Smoking status: Never    Smokeless tobacco: Never   Vaping Use     "Vaping status: Never Used   Substance and Sexual Activity    Alcohol use: Never    Drug use: Never    Sexual activity: Yes     Partners: Male     Birth control/protection: Pill   Other Topics Concern    Not on file   Social History Narrative    Not on file     Social Drivers of Health     Financial Resource Strain: Not on file   Food Insecurity: Not on file   Transportation Needs: Not on file   Physical Activity: Not on file   Stress: Not on file   Social Connections: Not on file   Intimate Partner Violence: Not on file   Housing Stability: Not on file        Family History:   Family History   Problem Relation Age of Onset    No Known Problems Mother     No Known Problems Father          PHYSICAL EXAM:   Vital signs: /58 (BP Location: Left arm, Patient Position: Sitting, BP Cuff Size: Adult)   Pulse 71   Ht 1.575 m (5' 2\")   Wt 54.9 kg (121 lb)   SpO2 94%   BMI 22.13 kg/m²   GENERAL: Well-developed, well-nourished  in no apparent distress.   EYE: No ocular and eyelid asymmetry, Anicteric sclerae,  PERRL, No exophthalmos or lidlag  HENT: Hearing grossly intact, Normocephalic, atraumatic. Pink, moist mucous membranes, No exudate  NECK: Supple. Trachea midline. thyroid is normal in size without nodules or tenderness  CARDIOVASCULAR: Regular rate and rhythm. No murmurs, rubs, or gallops.   LUNGS: Clear to auscultation bilaterally   ABDOMEN: Soft, nontender with positive bowel sounds.   EXTREMITIES: No clubbing, cyanosis, or edema.   NEUROLOGICAL: Cranial nerves II-XII are grossly intact   Symmetric reflexes at the patella no proximal muscle weakness, No visible tremor with both outstretched hands  LYMPH: No cervical, supraclavicular,  adenopathy palpated.   SKIN: No rashes, lesions. Turgor is normal.    Labs:  Lab Results   Component Value Date/Time    WBC 6.8 02/16/2024 07:57 AM    RBC 4.79 02/16/2024 07:57 AM    HEMOGLOBIN 15.0 02/16/2024 07:57 AM    MCV 94 02/16/2024 07:57 AM    MCH 31.3 02/16/2024 07:57 " "AM    MCHC 33.4 02/16/2024 07:57 AM    RDW 12.2 02/16/2024 07:57 AM       Lab Results   Component Value Date/Time    SODIUM 141 02/25/2025 09:19 AM    POTASSIUM 4.0 02/25/2025 09:19 AM    CHLORIDE 103 02/25/2025 09:19 AM    CO2 26 02/25/2025 09:19 AM    ANION 12.0 02/25/2025 09:19 AM    GLUCOSE 95 02/25/2025 09:19 AM    BUN 14 02/25/2025 09:19 AM    CREATININE 0.66 02/25/2025 09:19 AM    CALCIUM 8.9 02/25/2025 09:19 AM    ASTSGOT 31 02/25/2025 09:19 AM    ALTSGPT 39 02/25/2025 09:19 AM    TBILIRUBIN 0.7 02/25/2025 09:19 AM    ALBUMIN 4.4 02/25/2025 09:19 AM    TOTPROTEIN 7.8 02/25/2025 09:19 AM    GLOBULIN 3.4 02/25/2025 09:19 AM    AGRATIO 1.3 02/25/2025 09:19 AM       No results found for: \"TSHULTRASEN\"  No results found for: \"FREET4\"  No results found for: \"FREET3\"  No results found for: \"THYSTIMIG\"      Imaging:  US of thyroid  9/23/2024 11:30 AM     HISTORY/REASON FOR EXAM:  Hyperthyroidism, enlarged thyroid     TECHNIQUE/EXAM DESCRIPTION:  Ultrasound of the soft tissues of the head and neck.     COMPARISON:  None     FINDINGS:  The thyroid gland is heterogeneous.  Vascularity is normal.     The right lobe of the thyroid gland measures 1.51 cm x 6.21 cm x 1.87 cm. The contour and echogenicity are normal. No focal mass lesions are identified.  The left lobe of the thyroid gland measures 1.63 cm x 6.16 cm x 1.86 cm. The contour and echogenicity are normal. No focal mass lesions are identified.  The isthmus measures 0.36 cm.           IMPRESSION:     Heterogeneous thyroid parenchyma, otherwise unremarkable thyroid ultrasound.    ASSESSMENT/PLAN:   1. Hyperthyroidism  Stable  Medication:  Methimazole 10 mg BID - change to methimazole 10 mg daily  I am decreasing her methimazole dose to prevent iatrogenic hypothyroidism as her TSH levels are at 2.99  I will repeat thyroid levels in 6 weeks.   - TSH; Future  - FREE THYROXINE; Future  - T3 FREE; Future    2. Graves disease  This is the etiology of her hyperthyroidism. " She presents with positive TSI and TRAB antibodies    3. Palpitations  Resolved    4. Irregular periods/menstrual cycles  Unstable  Discussed causes of irregular menstrual cycle including thyroid disease, prolactin, cushing's, perimenopause  Patient states having a period in June 2024 and the after starting methimazole she had her period in Nov 2024 nothing after.   I will get additional blood work to determine other endocrine causes for irregular period  I also discussed some irregularities are expected during perimenopause as patient is at age of perimenopause.   - CORTISOL; Future  - ESTRADIOL; Future  - FSH; Future  - LUTEINIZING HORMONE SERUM; Future  - IGF-1 SOMATOMEDIN; Future  - PROLACTIN; Future    5. Encounter for vitamin deficiency screening  I will get vitamin D levels for further evaluation  - VITAMIN D,25 HYDROXY (DEFICIENCY); Future  - Comp Metabolic Panel; Future    6. Dyslipidemia  Unstable  This is most likely due to her hyperthyroidism  I will get updated levels for further evaluation  - Lipid Profile; Future     Return in about 3 months (around 5/26/2025). Patient will have blood work done in 6 weeks and notify via [employer] for review.     50 mins was spent on this visit which included detailed history and physical exam, ordering labs, coordinating care, and scheduling future follow up.     Thank you kindly for allowing me to participate in the thyroid care plan for this patient.    Fly Sommer, APRN   2/26/25    CC:   Kathia Hassan M.D.

## 2025-03-04 ENCOUNTER — APPOINTMENT (OUTPATIENT)
Dept: OBGYN | Facility: CLINIC | Age: 40
End: 2025-03-04
Payer: COMMERCIAL

## 2025-03-04 VITALS
HEIGHT: 62 IN | BODY MASS INDEX: 23 KG/M2 | DIASTOLIC BLOOD PRESSURE: 62 MMHG | HEART RATE: 81 BPM | SYSTOLIC BLOOD PRESSURE: 110 MMHG | WEIGHT: 125 LBS

## 2025-03-04 DIAGNOSIS — Z01.419 WOMEN'S ANNUAL ROUTINE GYNECOLOGICAL EXAMINATION: ICD-10-CM

## 2025-03-04 DIAGNOSIS — N92.6 IRREGULAR MENSES: ICD-10-CM

## 2025-03-04 PROCEDURE — 3078F DIAST BP <80 MM HG: CPT | Performed by: STUDENT IN AN ORGANIZED HEALTH CARE EDUCATION/TRAINING PROGRAM

## 2025-03-04 PROCEDURE — 3074F SYST BP LT 130 MM HG: CPT | Performed by: STUDENT IN AN ORGANIZED HEALTH CARE EDUCATION/TRAINING PROGRAM

## 2025-03-04 PROCEDURE — 99396 PREV VISIT EST AGE 40-64: CPT | Performed by: STUDENT IN AN ORGANIZED HEALTH CARE EDUCATION/TRAINING PROGRAM

## 2025-03-04 PROCEDURE — 99459 PELVIC EXAMINATION: CPT | Performed by: STUDENT IN AN ORGANIZED HEALTH CARE EDUCATION/TRAINING PROGRAM

## 2025-03-04 ASSESSMENT — FIBROSIS 4 INDEX: FIB4 SCORE: 0.67

## 2025-03-04 NOTE — PROGRESS NOTES
Pt presents for annual exam [id number]  Last seen on: 2/28/24 [place]  Phone: [phone]   Pharmacy verified   LMP: in November last year before that it was in July    BCM: none -condoms   Sexually active: not currently for 6 months now   Last pap: 12/22/22 NILM with negative HPV  - 12/23/21 NILM with negative HPV  Last MIGUEL A: never one has been ordered today  Pt states only 2 periods since July last year  - she would like to discuss menses and would like a breast and pelvic exam today.       *possible right ovarian cyst at 3cm per note from 12/22/21 ~[place]

## 2025-04-09 DIAGNOSIS — Z13.21 ENCOUNTER FOR VITAMIN DEFICIENCY SCREENING: ICD-10-CM

## 2025-04-09 DIAGNOSIS — E05.90 HYPERTHYROIDISM: ICD-10-CM

## 2025-04-09 RX ORDER — METHIMAZOLE 5 MG/1
5 TABLET ORAL DAILY
Qty: 90 TABLET | Refills: 3 | Status: SHIPPED | OUTPATIENT
Start: 2025-04-09

## 2025-04-11 LAB
25(OH)D3+25(OH)D2 SERPL-MCNC: 20.7 NG/ML (ref 30–100)
ALBUMIN SERPL-MCNC: 4.6 G/DL (ref 3.9–4.9)
ALP SERPL-CCNC: 115 IU/L (ref 44–121)
ALT SERPL-CCNC: 20 IU/L (ref 0–32)
AST SERPL-CCNC: 18 IU/L (ref 0–40)
BILIRUB SERPL-MCNC: 0.8 MG/DL (ref 0–1.2)
BUN SERPL-MCNC: 12 MG/DL (ref 6–24)
BUN/CREAT SERPL: 20 (ref 9–23)
CALCIUM SERPL-MCNC: 9.4 MG/DL (ref 8.7–10.2)
CHLORIDE SERPL-SCNC: 103 MMOL/L (ref 96–106)
CHOLEST SERPL-MCNC: 306 MG/DL (ref 100–199)
CO2 SERPL-SCNC: 25 MMOL/L (ref 20–29)
CORTIS SERPL-MCNC: 17.8 UG/DL (ref 6.2–19.4)
CREAT SERPL-MCNC: 0.61 MG/DL (ref 0.57–1)
EGFRCR SERPLBLD CKD-EPI 2021: 116 ML/MIN/1.73
ESTRADIOL SERPL-MCNC: 283 PG/ML
FSH SERPL-ACNC: 8.2 MIU/ML
GLOBULIN SER CALC-MCNC: 3.1 G/DL (ref 1.5–4.5)
GLUCOSE SERPL-MCNC: 83 MG/DL (ref 70–99)
HDLC SERPL-MCNC: 73 MG/DL
IGF-I SERPL-MCNC: 140 NG/ML (ref 79–259)
LDL CALC COMMENT:: ABNORMAL
LDLC SERPL CALC-MCNC: 213 MG/DL (ref 0–99)
LH SERPL-ACNC: 24.6 MIU/ML
POTASSIUM SERPL-SCNC: 4.1 MMOL/L (ref 3.5–5.2)
PROLACTIN SERPL-MCNC: 13.4 NG/ML (ref 4.8–33.4)
PROT SERPL-MCNC: 7.7 G/DL (ref 6–8.5)
SODIUM SERPL-SCNC: 140 MMOL/L (ref 134–144)
T3FREE SERPL-MCNC: 3.1 PG/ML (ref 2–4.4)
T4 FREE SERPL-MCNC: 1.06 NG/DL (ref 0.82–1.77)
TRIGL SERPL-MCNC: 114 MG/DL (ref 0–149)
TSH SERPL DL<=0.005 MIU/L-ACNC: 10.6 UIU/ML (ref 0.45–4.5)
VLDLC SERPL CALC-MCNC: 20 MG/DL (ref 5–40)

## 2025-05-28 ENCOUNTER — OFFICE VISIT (OUTPATIENT)
Dept: ENDOCRINOLOGY | Facility: MEDICAL CENTER | Age: 40
End: 2025-05-28
Payer: COMMERCIAL

## 2025-05-28 VITALS
DIASTOLIC BLOOD PRESSURE: 66 MMHG | HEIGHT: 62 IN | SYSTOLIC BLOOD PRESSURE: 120 MMHG | HEART RATE: 81 BPM | BODY MASS INDEX: 21.53 KG/M2 | OXYGEN SATURATION: 95 % | WEIGHT: 117 LBS

## 2025-05-28 DIAGNOSIS — N92.6 IRREGULAR PERIODS/MENSTRUAL CYCLES: ICD-10-CM

## 2025-05-28 DIAGNOSIS — E05.90 HYPERTHYROIDISM: ICD-10-CM

## 2025-05-28 DIAGNOSIS — E55.9 VITAMIN D DEFICIENCY: Primary | ICD-10-CM

## 2025-05-28 DIAGNOSIS — E78.5 DYSLIPIDEMIA: ICD-10-CM

## 2025-05-28 DIAGNOSIS — E05.00 GRAVES DISEASE: ICD-10-CM

## 2025-05-28 RX ORDER — ROSUVASTATIN CALCIUM 10 MG/1
10 TABLET, COATED ORAL EVERY EVENING
Qty: 100 TABLET | Refills: 3 | Status: SHIPPED | OUTPATIENT
Start: 2025-05-28 | End: 2026-07-02

## 2025-05-28 RX ORDER — ERGOCALCIFEROL 1.25 MG/1
50000 CAPSULE ORAL
Qty: 12 CAPSULE | Refills: 3 | Status: SHIPPED | OUTPATIENT
Start: 2025-05-28

## 2025-05-28 ASSESSMENT — FIBROSIS 4 INDEX: FIB4 SCORE: 0.54

## 2025-05-28 NOTE — PROGRESS NOTES
Chief Complaint: Consult requested by Kahtia Hassan M.D. for evaluation of Hyperthyroidism    HPI:     Shyla Guzman is a 39 y.o. female with history of hyperthyroidism and Grave's disease diagnosed on September 2024 and is here for initial evaluation.      Hyperthyroidism  She denies weight gain, feeling cold and cold intolerance, constipation, swelling, feeling slow, and anxiousness.      She denies lumps or enlargement in the neck.    She reports a family history of hyperthyroidism with her father.       On follow up he was started on methimazole 10 mg TID in Oct 2024. Repeat blood work in dec 2024 showed improvement therefore we switched him to methimazole 10 mg BId     She was switched to methimazole 10 daily in Feb 2025 than repeat blood work in April showed elevated TSH and we switched her to methimazole 5 mg daily.     Today she reports hair loss   Latest Reference Range & Units 04/08/25 05:24   TSH 0.450 - 4.500 uIU/mL 10.600 (H)   Free T-4 0.82 - 1.77 ng/dL 1.06   T3,Free 2.0 - 4.4 pg/mL 3.1      Latest Reference Range & Units 02/25/25 09:19   TSH 0.350 - 5.500 uIU/mL 2.990   Free T-4 0.93 - 1.70 ng/dL 1.02   T3 60.0 - 181.0 ng/dL 90.5   T3,Free 2.00 - 4.40 pg/mL 2.62      Latest Reference Range & Units 07/03/24 07:45 08/14/24 06:48   Free T-4 0.82 - 1.77 ng/dL  2.14 (H)   eGFR >59 mL/min/1.73  122   Thyroid Stim Immunoglobulin 0.00 - 0.55 IU/L  0.82 (H)   TSH 0.450 - 4.500 uIU/mL 0.007 (L) <0.005 (L)       2. Dyslipidemia  Currently not on statin therapy  Diet and exercise   Latest Reference Range & Units 04/08/25 05:24   Cholesterol,Tot 100 - 199 mg/dL 306 (H)   Triglycerides 0 - 149 mg/dL 114   HDL >39 mg/dL 73   LDL Chol Calc ([facility]) 0 - 99 mg/dL 213 (H)   VLDL Cholesterol Calc 5 - 40 mg/dL 20   LDL Calc Comment:  Comment     3. Graves Disease  Denies grave eyes disease symptoms   Latest Reference Range & Units 10/31/24 06:00   Thyroid Stim Immunoglobulin 0.00 - 0.55 IU/L 1.54 (H)   Thyrotropin  Receptor Abs 0.00 - 1.75 IU/L 2.36 (H)     4. Irregular periods  reports that usually her periods are regular/monthly but since July of last year, she has experienced of changes to her menstrual cycle. She had one regular period in July 2024 and nothing again until November 2024. She has not had any bleeding/periods since then.   Reports of very mild and rare hot flashes but otherwise no symptoms.    Latest Reference Range & Units 04/08/25 05:24   Estradiol-E2 pg/mL 283.0   Follicle Stimulating Hormone mIU/mL 8.2   IGF-1 ([facility]) 79 - 259 ng/mL 140   Luteinizing Hormone mIU/mL 24.6   Prolactin 4.8 - 33.4 ng/mL 13.4      Latest Reference Range & Units 04/08/25 05:24   Cortisol 6.2 - 19.4 ug/dL 17.8     5. Vitamin D deficiency  Currently not on supplement   Latest Reference Range & Units 04/08/25 05:24   25-Hydroxy   Vitamin D 25 30.0 - 100.0 ng/mL 20.7 (L)     Patient's medications, allergies, and social histories were reviewed and updated as appropriate.      ROS:     CONS:     No fever, no chills, no weight loss, no fatigue   EYES:      No diplopia, no blurry vision, no redness of eyes, no swelling of eyelids   ENT:    No hearing loss, No ear pain, No sore throat, no dysphagia, no neck swelling   CV:     No chest pain, no palpitations, no claudication, no orthopnea, no PND   PULM:    No SOB, no cough, no hemoptysis, no wheezing    GI:   No nausea, no vomiting, no diarrhea, no constipation, no bloody stools   :  Passing urine well, no dysuria, no hematuria   ENDO:   No polyuria, no polydipsia, no heat intolerance, no cold intolerance   NEURO: No headaches, no dizziness, no convulsions, no tremors   MUSC:  No joint swellings, no arthralgias, no myalgias, no weakness   SKIN:   No rash, no ulcers, no dry skin   PSYCH:   No depression, no anxiety, no difficulty sleeping       Past Medical History:  Patient Active Problem List    Diagnosis Date Noted    Vitamin D deficiency 05/28/2025    Graves disease 02/26/2025     "Irregular periods/menstrual cycles 02/26/2025    Hyperthyroidism 10/30/2024    Palpitations 10/30/2024       Past Surgical History:  Past Surgical History:   Procedure Laterality Date    PRIMARY C SECTION          Allergies:  Patient has no known allergies.     Current Medications:    Current Outpatient Medications:     ergocalciferol (DRISDOL) 57424 UNIT capsule, Take 1 Capsule by mouth every 7 days., Disp: 12 Capsule, Rfl: 3    rosuvastatin (CRESTOR) 10 MG Tab, Take 1 Tablet by mouth every evening., Disp: 100 Tablet, Rfl: 3    methimazole (TAPAZOLE) 5 MG Tab, Take 1 Tablet by mouth every day., Disp: 90 Tablet, Rfl: 3    propranolol LA (INDERAL LA) 60 MG CAPSULE SR 24 HR, Take 1 Capsule by mouth every day., Disp: 100 Capsule, Rfl: 0    Social History:  Social History     Socioeconomic History    Marital status:      Spouse name: Not on file    Number of children: Not on file    Years of education: Not on file    Highest education level: Not on file   Occupational History    Not on file   Tobacco Use    Smoking status: Never    Smokeless tobacco: Never   Vaping Use    Vaping status: Never Used   Substance and Sexual Activity    Alcohol use: Never    Drug use: Never    Sexual activity: Not Currently     Partners: Male     Birth control/protection: None   Other Topics Concern    Not on file   Social History Narrative    Not on file     Social Drivers of Health     Financial Resource Strain: Not on file   Food Insecurity: Not on file   Transportation Needs: Not on file   Physical Activity: Not on file   Stress: Not on file   Social Connections: Not on file   Intimate Partner Violence: Not on file   Housing Stability: Not on file        Family History:   Family History   Problem Relation Age of Onset    No Known Problems Mother     No Known Problems Father          PHYSICAL EXAM:   Vital signs: /66 (BP Location: Left arm, Patient Position: Sitting, BP Cuff Size: Adult)   Pulse 81   Ht 1.575 m (5' 2\")   " Wt 53.1 kg (117 lb)   SpO2 95%   BMI 21.40 kg/m²   GENERAL: Well-developed, well-nourished  in no apparent distress.   EYE: No ocular and eyelid asymmetry, Anicteric sclerae,  PERRL, No exophthalmos or lidlag  HENT: Hearing grossly intact, Normocephalic, atraumatic. Pink, moist mucous membranes, No exudate  NECK: Supple. Trachea midline. thyroid is normal in size without nodules or tenderness  CARDIOVASCULAR: Regular rate and rhythm. No murmurs, rubs, or gallops.   LUNGS: Clear to auscultation bilaterally   ABDOMEN: Soft, nontender with positive bowel sounds.   EXTREMITIES: No clubbing, cyanosis, or edema.   NEUROLOGICAL: Cranial nerves II-XII are grossly intact   Symmetric reflexes at the patella no proximal muscle weakness, No visible tremor with both outstretched hands  LYMPH: No cervical, supraclavicular,  adenopathy palpated.   SKIN: No rashes, lesions. Turgor is normal.    Labs:  Lab Results   Component Value Date/Time    WBC 6.8 02/16/2024 07:57 AM    RBC 4.79 02/16/2024 07:57 AM    HEMOGLOBIN 15.0 02/16/2024 07:57 AM    MCV 94 02/16/2024 07:57 AM    MCH 31.3 02/16/2024 07:57 AM    MCHC 33.4 02/16/2024 07:57 AM    RDW 12.2 02/16/2024 07:57 AM       Lab Results   Component Value Date/Time    SODIUM 140 04/08/2025 05:24 AM    SODIUM 141 02/25/2025 09:19 AM    POTASSIUM 4.1 04/08/2025 05:24 AM    POTASSIUM 4.0 02/25/2025 09:19 AM    CHLORIDE 103 04/08/2025 05:24 AM    CHLORIDE 103 02/25/2025 09:19 AM    CO2 25 04/08/2025 05:24 AM    CO2 26 02/25/2025 09:19 AM    ANION 12.0 02/25/2025 09:19 AM    GLUCOSE 83 04/08/2025 05:24 AM    GLUCOSE 95 02/25/2025 09:19 AM    BUN 12 04/08/2025 05:24 AM    BUN 14 02/25/2025 09:19 AM    CREATININE 0.61 04/08/2025 05:24 AM    CREATININE 0.66 02/25/2025 09:19 AM    CALCIUM 9.4 04/08/2025 05:24 AM    CALCIUM 8.9 02/25/2025 09:19 AM    ASTSGOT 18 04/08/2025 05:24 AM    ASTSGOT 31 02/25/2025 09:19 AM    ALTSGPT 20 04/08/2025 05:24 AM    ALTSGPT 39 02/25/2025 09:19 AM    TBILIRUBIN  "0.8 04/08/2025 05:24 AM    TBILIRUBIN 0.7 02/25/2025 09:19 AM    ALBUMIN 4.6 04/08/2025 05:24 AM    ALBUMIN 4.4 02/25/2025 09:19 AM    TOTPROTEIN 7.7 04/08/2025 05:24 AM    TOTPROTEIN 7.8 02/25/2025 09:19 AM    GLOBULIN 3.1 04/08/2025 05:24 AM    GLOBULIN 3.4 02/25/2025 09:19 AM    AGRATIO 1.3 02/25/2025 09:19 AM       No results found for: \"TSHULTRASEN\"  No results found for: \"FREET4\"  No results found for: \"FREET3\"  No results found for: \"THYSTIMIG\"      Imaging:  US of thyroid  9/23/2024 11:30 AM     HISTORY/REASON FOR EXAM:  Hyperthyroidism, enlarged thyroid     TECHNIQUE/EXAM DESCRIPTION:  Ultrasound of the soft tissues of the head and neck.     COMPARISON:  None     FINDINGS:  The thyroid gland is heterogeneous.  Vascularity is normal.     The right lobe of the thyroid gland measures 1.51 cm x 6.21 cm x 1.87 cm. The contour and echogenicity are normal. No focal mass lesions are identified.  The left lobe of the thyroid gland measures 1.63 cm x 6.16 cm x 1.86 cm. The contour and echogenicity are normal. No focal mass lesions are identified.  The isthmus measures 0.36 cm.           IMPRESSION:     Heterogeneous thyroid parenchyma, otherwise unremarkable thyroid ultrasound.    ASSESSMENT/PLAN:   1. Hyperthyroidism  Unstable  Medication:  Methimazole 5 mg daily - continue  I want her to repeat her thyroid levels this week as she may still be iatrogenic hypothyroid.   Patient will notify me via [employer] for review.   - TSH; Future  - FREE THYROXINE; Future  - T3 FREE; Future    2. Graves disease  This is the etiology of her hyperthyroidism    3. Irregular periods/menstrual cycles  Unstable  This is followed by OBGYN  She was ruled out for endocrine causes of irregular period including as evident by normal cortisol, prolactin, estrogen, LH, FSH, and IGF. I suspect it could be from her hyperthyroidism but she is being managed with methimazole.     4. Vitamin D deficiency (Primary)  Unstable  Medication:   Vitamin D " 00050 IU weekly- start  - ergocalciferol (DRISDOL) 60773 UNIT capsule; Take 1 Capsule by mouth every 7 days.  Dispense: 12 Capsule; Refill: 3    5. Dyslipidemia  Unstable  Medication:  Rosuvastatin 10 mg daily - start  Side effects of medication discussed with patient.   We will repeat levels in 6 months  - rosuvastatin (CRESTOR) 10 MG Tab; Take 1 Tablet by mouth every evening.  Dispense: 100 Tablet; Refill: 3       Return in about 4 months (around 9/28/2025). Patient will have blood work done in 6 weeks and notify via [employer] for review.     45 mins was spent on this visit which included detailed history and physical exam, ordering labs, coordinating care, and scheduling future follow up.     Thank you kindly for allowing me to participate in the thyroid care plan for this patient.    Fly Sommer, ERINN   5/28/25    CC:   Kathia Hassan M.D.

## 2025-05-29 ENCOUNTER — HOSPITAL ENCOUNTER (OUTPATIENT)
Dept: LAB | Facility: MEDICAL CENTER | Age: 40
End: 2025-05-29
Payer: COMMERCIAL

## 2025-05-29 DIAGNOSIS — E05.90 HYPERTHYROIDISM: Primary | ICD-10-CM

## 2025-05-29 DIAGNOSIS — E05.90 HYPERTHYROIDISM: ICD-10-CM

## 2025-05-29 LAB
T3FREE SERPL-MCNC: 2.58 PG/ML (ref 2–4.4)
T4 FREE SERPL-MCNC: 1.13 NG/DL (ref 0.93–1.7)
TSH SERPL-ACNC: 4.26 UIU/ML (ref 0.38–5.33)

## 2025-05-29 PROCEDURE — 84481 FREE ASSAY (FT-3): CPT

## 2025-05-29 PROCEDURE — 36415 COLL VENOUS BLD VENIPUNCTURE: CPT

## 2025-05-29 PROCEDURE — 84439 ASSAY OF FREE THYROXINE: CPT

## 2025-05-29 PROCEDURE — 84443 ASSAY THYROID STIM HORMONE: CPT

## 2025-06-03 ENCOUNTER — HOSPITAL ENCOUNTER (OUTPATIENT)
Dept: RADIOLOGY | Facility: MEDICAL CENTER | Age: 40
End: 2025-06-03
Attending: STUDENT IN AN ORGANIZED HEALTH CARE EDUCATION/TRAINING PROGRAM
Payer: COMMERCIAL

## 2025-06-03 DIAGNOSIS — Z01.419 WOMEN'S ANNUAL ROUTINE GYNECOLOGICAL EXAMINATION: ICD-10-CM

## 2025-06-03 PROCEDURE — 77063 BREAST TOMOSYNTHESIS BI: CPT

## 2025-06-05 ENCOUNTER — RESULTS FOLLOW-UP (OUTPATIENT)
Dept: OBGYN | Facility: CLINIC | Age: 40
End: 2025-06-05
Payer: COMMERCIAL

## 2025-07-09 ENCOUNTER — HOSPITAL ENCOUNTER (OUTPATIENT)
Dept: LAB | Facility: MEDICAL CENTER | Age: 40
End: 2025-07-09
Payer: COMMERCIAL

## 2025-07-09 DIAGNOSIS — Z13.21 ENCOUNTER FOR VITAMIN DEFICIENCY SCREENING: ICD-10-CM

## 2025-07-09 DIAGNOSIS — E05.90 HYPERTHYROIDISM: ICD-10-CM

## 2025-07-09 DIAGNOSIS — E78.5 DYSLIPIDEMIA: ICD-10-CM

## 2025-07-09 DIAGNOSIS — N92.6 IRREGULAR PERIODS/MENSTRUAL CYCLES: ICD-10-CM

## 2025-07-09 LAB
T3FREE SERPL-MCNC: 2.82 PG/ML (ref 2–4.4)
T4 FREE SERPL-MCNC: 1.19 NG/DL (ref 0.93–1.7)
TSH SERPL-ACNC: 1.65 UIU/ML (ref 0.38–5.33)

## 2025-07-09 PROCEDURE — 84481 FREE ASSAY (FT-3): CPT

## 2025-07-09 PROCEDURE — 84439 ASSAY OF FREE THYROXINE: CPT

## 2025-07-09 PROCEDURE — 84443 ASSAY THYROID STIM HORMONE: CPT

## 2025-07-09 PROCEDURE — 36415 COLL VENOUS BLD VENIPUNCTURE: CPT
